# Patient Record
Sex: MALE | Race: BLACK OR AFRICAN AMERICAN | NOT HISPANIC OR LATINO
[De-identification: names, ages, dates, MRNs, and addresses within clinical notes are randomized per-mention and may not be internally consistent; named-entity substitution may affect disease eponyms.]

---

## 2021-04-30 ENCOUNTER — TRANSCRIPTION ENCOUNTER (OUTPATIENT)
Age: 31
End: 2021-04-30

## 2021-05-03 ENCOUNTER — TRANSCRIPTION ENCOUNTER (OUTPATIENT)
Age: 31
End: 2021-05-03

## 2021-05-13 ENCOUNTER — TRANSCRIPTION ENCOUNTER (OUTPATIENT)
Age: 31
End: 2021-05-13

## 2021-09-01 ENCOUNTER — TRANSCRIPTION ENCOUNTER (OUTPATIENT)
Age: 31
End: 2021-09-01

## 2021-10-18 ENCOUNTER — TRANSCRIPTION ENCOUNTER (OUTPATIENT)
Age: 31
End: 2021-10-18

## 2022-11-21 ENCOUNTER — NON-APPOINTMENT (OUTPATIENT)
Age: 32
End: 2022-11-21

## 2022-12-15 ENCOUNTER — EMERGENCY (EMERGENCY)
Facility: HOSPITAL | Age: 32
LOS: 1 days | Discharge: HOME | End: 2022-12-15
Attending: EMERGENCY MEDICINE
Payer: COMMERCIAL

## 2022-12-15 VITALS
OXYGEN SATURATION: 97 % | HEIGHT: 71 IN | TEMPERATURE: 98 F | SYSTOLIC BLOOD PRESSURE: 160 MMHG | DIASTOLIC BLOOD PRESSURE: 110 MMHG | HEART RATE: 97 BPM | WEIGHT: 315 LBS | RESPIRATION RATE: 18 BRPM

## 2022-12-15 PROCEDURE — 99053 MED SERV 10PM-8AM 24 HR FAC: CPT

## 2022-12-15 PROCEDURE — 99284 EMERGENCY DEPT VISIT MOD MDM: CPT

## 2022-12-15 RX ORDER — MORPHINE SULFATE 50 MG/1
4 CAPSULE, EXTENDED RELEASE ORAL ONCE
Refills: 0 | Status: DISCONTINUED | OUTPATIENT
Start: 2022-12-15 | End: 2022-12-15

## 2022-12-15 NOTE — ED PROVIDER NOTE - PHYSICAL EXAMINATION
abrasion left posterior scalp  no midline ttp neck or back  RUE nontender, full ROM all joints, N/V intact  right hip: ttp over lateral aspect. decreased ROM 2/2 pain  right knee: ttp over lateral aspect and over patella. unable to range 2/2 pain.  remainder of the RLE nontender, no swelling, full ROM  abrasions to right lower abdomen. remainder of abdomen nontender.  left hand with swelling and ttp over dorsal hand with abrasions and decreased ROM 2/2 pain. abrasion left posterior scalp  no midline ttp neck or back  RUE nontender, full ROM all joints, N/V intact  right hip: ttp over lateral aspect. decreased ROM 2/2 pain  right knee: ttp over lateral aspect and over patella. unable to range 2/2 pain.  remainder of the RLE nontender, no swelling, full ROM  abrasions to right lower abdomen. remainder of abdomen nontender.  left hand with swelling and ttp over dorsal hand with abrasions and decreased ROM 2/2 pain. no snuffbox ttp.
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
## 2022-12-15 NOTE — ED PROVIDER NOTE - NSFOLLOWUPINSTRUCTIONS_ED_ALL_ED_FT
[facility]                                                                                                                                                            Acute Knee Pain, Adult      Acute knee pain is sudden and may be caused by damage, swelling, or irritation of the muscles and tissues that support the knee. Pain may result from:  •A fall.      •An injury to the knee from twisting motions.      •A hit to the knee.      •Infection.      Acute knee pain may go away on its own with time and rest. If it does not, your health care provider may order tests to find the cause of the pain. These may include:  •Imaging tests, such as an X-ray, MRI, CT scan, or ultrasound.      •Joint aspiration. In this test, fluid is removed from the knee and evaluated.      •Arthroscopy. In this test, a lighted tube is inserted into the knee and an image is projected onto a TV screen.      •Biopsy. In this test, a sample of tissue is removed from the body and studied under a microscope.        Follow these instructions at home:      If you have a knee sleeve or brace:      •Wear the knee sleeve or brace as told by your health care provider. Remove it only as told by your health care provider.      •Loosen it if your toes tingle, become numb, or turn cold and blue.      •Keep it clean.    •If the knee sleeve or brace is not waterproof:  •Do not let it get wet.      •Cover it with a watertight covering when you take a bath or shower.        Activity     •Rest your knee.      • Do not do things that cause pain or make pain worse.      •Avoid high-impact activities or exercises, such as running, jumping rope, or doing jumping jacks.      •Work with a physical therapist to make a safe exercise program, as recommended by your health care provider. Do exercises as told by your physical therapist.        Managing pain, stiffness, and swelling    •If directed, put ice on the affected knee. To do this:  •If you have a removable knee sleeve or brace, remove it as told by your health care provider.      •Put ice in a plastic bag.      •Place a towel between your skin and the bag.      •Leave the ice on for 20 minutes, 2–3 times a day.      •Remove the ice if your skin turns bright red. This is very important. If you cannot feel pain, heat, or cold, you have a greater risk of damage to the area.        •If directed, use an elastic bandage to put pressure (compression) on your injured knee. This may control swelling, give support, and help with discomfort.      •Raise (elevate) your knee above the level of your heart while you are sitting or lying down.      •Sleep with a pillow under your knee.      General instructions     •Take over-the-counter and prescription medicines only as told by your health care provider.      • Do not use any products that contain nicotine or tobacco, such as cigarettes, e-cigarettes, and chewing tobacco. If you need help quitting, ask your health care provider.      •If you are overweight, work with your health care provider and a dietitian to set a weight-loss goal that is healthy and reasonable for you. Extra weight can put pressure on your knee.      •Pay attention to any changes in your symptoms.      •Keep all follow-up visits. This is important.        Contact a health care provider if:    •Your knee pain continues, changes, or gets worse.      •You have a fever along with knee pain.      •Your knee feels warm to the touch or is red.      •Your knee german or locks up.        Get help right away if:    •Your knee swells, and the swelling becomes worse.      •You cannot move your knee.      •You have severe pain in your knee that cannot be managed with pain medicine.        Summary    •Acute knee pain can be caused by a fall, an injury, an infection, or damage, swelling, or irritation of the tissues that support your knee.      •Your health care provider may perform tests to find out the cause of the pain.      •Pay attention to any changes in your symptoms. Relieve your pain with rest, medicines, light activity, and the use of ice.      •Get help right away if your knee swells, you cannot move your knee, or you have severe pain that cannot be managed with medicine.      This information is not intended to replace advice given to you by your health care provider. Make sure you discuss any questions you have with your health care provider.      Document Revised: 06/02/2021 Document Reviewed: 06/02/2021    [employer] Patient Education © 2022 [employer]

## 2022-12-15 NOTE — ED PROVIDER NOTE - PROGRESS NOTE DETAILS
ct head and cervical spine WNL. ct abdo/pelvis WNL. no obvious fractures on xray. unable to ambulate 2/2 right knee pain. ct ordered. ct right knee with ?medial fracture. placed in splint and given crutches. rx for pain meds. f/u with ortho. return precautions discussed.

## 2022-12-15 NOTE — ED PROVIDER NOTE - PATIENT PORTAL LINK FT
You can access the FollowMyHealth Patient Portal offered by [facility] by registering at the following website: [email or web address]. By joining [employer]’s FollowMyHealth portal, you will also be able to view your health information using other applications (apps) compatible with our system.
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
## 2022-12-15 NOTE — ED PROVIDER NOTE - NSFOLLOWUPCLINICS_GEN_ALL_ED_FT
[facility]  Orthopedics  [address] [place] [address]  Phone: [phone]  Fax: [phone]

## 2022-12-16 LAB
ALBUMIN SERPL ELPH-MCNC: 3.7 G/DL — SIGNIFICANT CHANGE UP (ref 3.5–5)
ALP SERPL-CCNC: 62 U/L — SIGNIFICANT CHANGE UP (ref 40–120)
ALT FLD-CCNC: 50 U/L DA — SIGNIFICANT CHANGE UP (ref 10–60)
ANION GAP SERPL CALC-SCNC: 8 MMOL/L — SIGNIFICANT CHANGE UP (ref 5–17)
AST SERPL-CCNC: 36 U/L — SIGNIFICANT CHANGE UP (ref 10–40)
BASOPHILS # BLD AUTO: 0.03 K/UL — SIGNIFICANT CHANGE UP (ref 0–0.2)
BASOPHILS NFR BLD AUTO: 0.3 % — SIGNIFICANT CHANGE UP (ref 0–2)
BILIRUB SERPL-MCNC: 0.8 MG/DL — SIGNIFICANT CHANGE UP (ref 0.2–1.2)
BUN SERPL-MCNC: 15 MG/DL — SIGNIFICANT CHANGE UP (ref 7–18)
CALCIUM SERPL-MCNC: 9.3 MG/DL — SIGNIFICANT CHANGE UP (ref 8.4–10.5)
CHLORIDE SERPL-SCNC: 106 MMOL/L — SIGNIFICANT CHANGE UP (ref 96–108)
CO2 SERPL-SCNC: 27 MMOL/L — SIGNIFICANT CHANGE UP (ref 22–31)
CREAT SERPL-MCNC: 1.16 MG/DL — SIGNIFICANT CHANGE UP (ref 0.5–1.3)
EGFR: 86 ML/MIN/1.73M2 — SIGNIFICANT CHANGE UP
EOSINOPHIL # BLD AUTO: 0.03 K/UL — SIGNIFICANT CHANGE UP (ref 0–0.5)
EOSINOPHIL NFR BLD AUTO: 0.3 % — SIGNIFICANT CHANGE UP (ref 0–6)
GLUCOSE SERPL-MCNC: 121 MG/DL — HIGH (ref 70–99)
HCT VFR BLD CALC: 43.5 % — SIGNIFICANT CHANGE UP (ref 39–50)
HGB BLD-MCNC: 14.1 G/DL — SIGNIFICANT CHANGE UP (ref 13–17)
IMM GRANULOCYTES NFR BLD AUTO: 0.3 % — SIGNIFICANT CHANGE UP (ref 0–0.9)
LYMPHOCYTES # BLD AUTO: 1.73 K/UL — SIGNIFICANT CHANGE UP (ref 1–3.3)
LYMPHOCYTES # BLD AUTO: 17.1 % — SIGNIFICANT CHANGE UP (ref 13–44)
MCHC RBC-ENTMCNC: 28.1 PG — SIGNIFICANT CHANGE UP (ref 27–34)
MCHC RBC-ENTMCNC: 32.4 GM/DL — SIGNIFICANT CHANGE UP (ref 32–36)
MCV RBC AUTO: 86.7 FL — SIGNIFICANT CHANGE UP (ref 80–100)
MONOCYTES # BLD AUTO: 0.8 K/UL — SIGNIFICANT CHANGE UP (ref 0–0.9)
MONOCYTES NFR BLD AUTO: 7.9 % — SIGNIFICANT CHANGE UP (ref 2–14)
NEUTROPHILS # BLD AUTO: 7.52 K/UL — HIGH (ref 1.8–7.4)
NEUTROPHILS NFR BLD AUTO: 74.1 % — SIGNIFICANT CHANGE UP (ref 43–77)
NRBC # BLD: 0 /100 WBCS — SIGNIFICANT CHANGE UP (ref 0–0)
PLATELET # BLD AUTO: 209 K/UL — SIGNIFICANT CHANGE UP (ref 150–400)
POTASSIUM SERPL-MCNC: 4.1 MMOL/L — SIGNIFICANT CHANGE UP (ref 3.5–5.3)
POTASSIUM SERPL-SCNC: 4.1 MMOL/L — SIGNIFICANT CHANGE UP (ref 3.5–5.3)
PROT SERPL-MCNC: 7.7 G/DL — SIGNIFICANT CHANGE UP (ref 6–8.3)
RBC # BLD: 5.02 M/UL — SIGNIFICANT CHANGE UP (ref 4.2–5.8)
RBC # FLD: 12.6 % — SIGNIFICANT CHANGE UP (ref 10.3–14.5)
SODIUM SERPL-SCNC: 141 MMOL/L — SIGNIFICANT CHANGE UP (ref 135–145)
WBC # BLD: 10.14 K/UL — SIGNIFICANT CHANGE UP (ref 3.8–10.5)
WBC # FLD AUTO: 10.14 K/UL — SIGNIFICANT CHANGE UP (ref 3.8–10.5)

## 2022-12-16 PROCEDURE — 72125 CT NECK SPINE W/O DYE: CPT | Mod: MA

## 2022-12-16 PROCEDURE — 73564 X-RAY EXAM KNEE 4 OR MORE: CPT | Mod: 26,RT

## 2022-12-16 PROCEDURE — 73130 X-RAY EXAM OF HAND: CPT

## 2022-12-16 PROCEDURE — 73564 X-RAY EXAM KNEE 4 OR MORE: CPT

## 2022-12-16 PROCEDURE — 73610 X-RAY EXAM OF ANKLE: CPT | Mod: 26,RT

## 2022-12-16 PROCEDURE — 73700 CT LOWER EXTREMITY W/O DYE: CPT | Mod: MA

## 2022-12-16 PROCEDURE — 70450 CT HEAD/BRAIN W/O DYE: CPT | Mod: MA

## 2022-12-16 PROCEDURE — 72125 CT NECK SPINE W/O DYE: CPT | Mod: 26,MA

## 2022-12-16 PROCEDURE — 36415 COLL VENOUS BLD VENIPUNCTURE: CPT

## 2022-12-16 PROCEDURE — 73610 X-RAY EXAM OF ANKLE: CPT

## 2022-12-16 PROCEDURE — 74177 CT ABD & PELVIS W/CONTRAST: CPT | Mod: MA

## 2022-12-16 PROCEDURE — 73502 X-RAY EXAM HIP UNI 2-3 VIEWS: CPT | Mod: 26,RT

## 2022-12-16 PROCEDURE — 73130 X-RAY EXAM OF HAND: CPT | Mod: 26,LT

## 2022-12-16 PROCEDURE — 74177 CT ABD & PELVIS W/CONTRAST: CPT | Mod: 26,MA

## 2022-12-16 PROCEDURE — 99284 EMERGENCY DEPT VISIT MOD MDM: CPT | Mod: 25

## 2022-12-16 PROCEDURE — 80053 COMPREHEN METABOLIC PANEL: CPT

## 2022-12-16 PROCEDURE — 73590 X-RAY EXAM OF LOWER LEG: CPT

## 2022-12-16 PROCEDURE — 70450 CT HEAD/BRAIN W/O DYE: CPT | Mod: 26,MA

## 2022-12-16 PROCEDURE — 73590 X-RAY EXAM OF LOWER LEG: CPT | Mod: 26,RT

## 2022-12-16 PROCEDURE — 85025 COMPLETE CBC W/AUTO DIFF WBC: CPT

## 2022-12-16 PROCEDURE — 73700 CT LOWER EXTREMITY W/O DYE: CPT | Mod: 26,RT,MA

## 2022-12-16 PROCEDURE — 73502 X-RAY EXAM HIP UNI 2-3 VIEWS: CPT

## 2022-12-16 RX ORDER — CYCLOBENZAPRINE HYDROCHLORIDE 10 MG/1
10 TABLET, FILM COATED ORAL ONCE
Refills: 0 | Status: COMPLETED | OUTPATIENT
Start: 2022-12-16 | End: 2022-12-16

## 2022-12-16 RX ORDER — IBUPROFEN 200 MG
1 TABLET ORAL
Qty: 30 | Refills: 0
Start: 2022-12-16 | End: 2022-12-25

## 2022-12-16 RX ORDER — OXYCODONE AND ACETAMINOPHEN 5; 325 MG/1; MG/1
1 TABLET ORAL
Qty: 20 | Refills: 0
Start: 2022-12-16 | End: 2022-12-20

## 2022-12-16 RX ADMIN — MORPHINE SULFATE 4 MILLIGRAM(S): 50 CAPSULE, EXTENDED RELEASE ORAL at 00:07

## 2022-12-16 RX ADMIN — CYCLOBENZAPRINE HYDROCHLORIDE 10 MILLIGRAM(S): 10 TABLET, FILM COATED ORAL at 03:32

## 2022-12-16 RX ADMIN — MORPHINE SULFATE 4 MILLIGRAM(S): 50 CAPSULE, EXTENDED RELEASE ORAL at 00:37
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
## 2022-12-22 ENCOUNTER — FORM ENCOUNTER (OUTPATIENT)
Age: 32
End: 2022-12-22

## 2022-12-23 ENCOUNTER — APPOINTMENT (OUTPATIENT)
Dept: ORTHOPEDIC SURGERY | Facility: CLINIC | Age: 32
End: 2022-12-23
Payer: COMMERCIAL

## 2022-12-23 ENCOUNTER — APPOINTMENT (OUTPATIENT)
Dept: MRI IMAGING | Facility: CLINIC | Age: 32
End: 2022-12-23

## 2022-12-23 VITALS — WEIGHT: 315 LBS | HEIGHT: 71 IN | BODY MASS INDEX: 44.1 KG/M2

## 2022-12-23 DIAGNOSIS — Z86.79 PERSONAL HISTORY OF OTHER DISEASES OF THE CIRCULATORY SYSTEM: ICD-10-CM

## 2022-12-23 DIAGNOSIS — M23.91 UNSPECIFIED INTERNAL DERANGEMENT OF RIGHT KNEE: ICD-10-CM

## 2022-12-23 PROCEDURE — L3809: CPT

## 2022-12-23 PROCEDURE — 99204 OFFICE O/P NEW MOD 45 MIN: CPT

## 2022-12-23 PROCEDURE — 99072 ADDL SUPL MATRL&STAF TM PHE: CPT

## 2022-12-23 PROCEDURE — 73721 MRI JNT OF LWR EXTRE W/O DYE: CPT | Mod: RT

## 2022-12-23 RX ORDER — LOSARTAN POTASSIUM 100 MG/1
TABLET, FILM COATED ORAL
Refills: 0 | Status: ACTIVE | COMMUNITY

## 2022-12-23 NOTE — DATA REVIEWED
[CT Scan] : CT scan [Right] : of the right [Knee] : knee [I independently reviewed and interpreted images and report] : I independently reviewed and interpreted images and report [FreeTextEntry1] : ocd mfc. medial compartment djd. loose body. effusoin

## 2022-12-23 NOTE — IMAGING
[de-identified] : \par ----------------------------------------------------------------------------\par \par Right knee exam: \par \par obesity\par Skin: no significant pertinent finding\par Inspection: \par    +Effusion\par    (-) Malalignment\par    (-) Swelling\par    (-) Quad atrophy\par    (-) J-sign\par ROM: \par    0 - 70 degrees of flexion.\par Tenderness: \par    +MJLT\par    (-) LJLT\par    +Medial patellar facet tenderness\par    +Lateral patellar facet tenderness\par    (-) Crepitus\par    (-) Patellar grind tenderness\par    (-) Patellar tendon\par    (-) Quad tendon\par    Other: \par Stability: \par    equivocal Lachman\par    +Varus/Valgus instability\par Strength: 5/5 Q/H/TA/GS/EHL\par Neuro: In tact to light touch throughout, DTR's wnl\par Vascularity: Extremity warm and well perfused\par Gait: antalgic with crtuches\par  \par \par ----------------------------------------------------------------------------\par \par Left wrist/hand exam: \par \par Inspection: \par    (-) Deformity\par    (-) Swelling/Effusion\par ROM: \par    WF: 80  WE: 80   Digit ROM: full\par Tenderness: \par    (-) Dorsal wrist      (-) Volar wrist\par    (-) Radial wrist       (-) Ulnar wrist\par    (-) Snuff box\par    (-) DRUJ\par    (-) Thumb CMC\par    (-) A1 pulley                                                               (-) 1st   (-) 2nd   (-) 3rd   (-) 4th   (-) 5th\par    (-) Metacarpal        (-) Phalanges                               (-) 1st   (-) 2nd   (-) 3rd   (-) 4th   (-) 5th\par    +MP joint              (-) PIP joint           (-) DIP joint       +1st   (-) 2nd   (-) 3rd   (-) 4th   (-) 5th   no instability \par Additional tests: \par    (-) Carpal compression\par    (-) Tinel's\par    (-) Finkelstein's\par    (-) Tony's\par    (-) CMC grind\par    (-) TFCC grind\par    (-) Tight lateral retinaculum\par Strength: 5/5 DF/VF//Intrinsic\par Neuro: In tact to light touch throughout all distributions\par Vascularity: Extremity warm and well perfused\par \par  [Left] : left hand [Right] : right knee [All Views] : anteroposterior, lateral, skyline, and anteroposterior standing [There are no fractures, subluxations or dislocations. No significant abnormalities are seen] : There are no fractures, subluxations or dislocations. No significant abnormalities are seen [FreeTextEntry9] : medial oa. ? loose body

## 2022-12-23 NOTE — DISCUSSION/SUMMARY
[de-identified] : ice, rest, continue brace. crutches. nsaids prn\par mri right knee ro internal derangeent acl/mcl. \par thumb spica brace distributed and fitted\par \par ----------------------------------------------------------------------------\par \par The patient was advised of the diagnosis.  The natural history of the pathology was explained in full. All questions were answered.  The risks and benefits of conservative and interventional treatment alternatives were explained to the patient\par \par \par ----------------------------------------------------------------------------\par \par The patient was advised that an advanced diagnostic/imaging study (MRI/CT/etc) will be ordered to evaluate potential pathology in the affected area(s).  The patient was further advised to follow up in the office to review the results of the study and determine further management that may be indicated.\par \par \par ----------------------------------------------------------------------------\par \par MRI(s) and/or other advanced imaging studies (CT/etc) were reviewed with the patient. Implications of the study(ies) together with the patient's clinical picture were discussed to formulate a working diagnosis and management options were detailed.\par
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
## 2022-12-28 ENCOUNTER — TRANSCRIPTION ENCOUNTER (OUTPATIENT)
Age: 32
End: 2022-12-28

## 2022-12-28 ENCOUNTER — APPOINTMENT (OUTPATIENT)
Dept: ORTHOPEDIC SURGERY | Facility: CLINIC | Age: 32
End: 2022-12-28

## 2022-12-28 VITALS — HEIGHT: 71 IN | WEIGHT: 315 LBS | BODY MASS INDEX: 44.1 KG/M2

## 2022-12-28 PROCEDURE — 99215 OFFICE O/P EST HI 40 MIN: CPT

## 2022-12-28 PROCEDURE — L1833: CPT | Mod: RT

## 2022-12-28 PROCEDURE — 99072 ADDL SUPL MATRL&STAF TM PHE: CPT

## 2022-12-29 NOTE — DATA REVIEWED
[CT Scan] : CT scan [MRI] : MRI [Right] : of the right [Knee] : knee [I independently reviewed and interpreted images and report] : I independently reviewed and interpreted images and report [FreeTextEntry1] : ocd mfc. medial compartment djd. loose body. effusoin [FreeTextEntry2] : complete acl tear, complete mcl tear, ?lcl tear. pcl attenuation, mfc ocd, signfiicant medial compartment djd, varus malalignment, effusion.

## 2022-12-29 NOTE — DISCUSSION/SUMMARY
[de-identified] : reviewed mri. discssed compliex pathology, including signiificant varus malalignment, mcl tear which appears unlikeely to heal, ?lcll and pcl injury which would require exam under anesthesia to better assess given patient body habitus. advised signfiicant djd which would likely result in short and long term dysfunction even with surgical managemnt of ligament injuries. disucssed likely need for TKA at some point in the future, but currently patients signifcant obesity would not qualify him.  advised he therefore needs stabilization of hiss knee, however with extent of malalignment, i am not sure isloated ligament reconstruction will not fiail. advised patient would likely require osteotomy, mcl repair/reconstruction and possible further staged procedures thereafter. we will continue to consider operative approach. first patient will obtain CT angiogram right knee as patient may have had a knee dislocation that spontaneously reduced.  begin PT. he will utilize a hinged knee brace for stability.  wbat with brace. \par \par ----------------------------------------------------------------------------\par \par Bracing options discussed for patient's relative knee instability. Hinged knee brace demonstrated, distributed, and fitted.\par \par ----------------------------------------------------------------------------\par \par The patient was advised of the diagnosis.  The natural history of the pathology was explained in full. All questions were answered.  The risks and benefits of conservative and interventional treatment alternatives were explained to the patient\par \par \par ----------------------------------------------------------------------------\par \par MRI(s) and/or other advanced imaging studies (CT/etc) were reviewed with the patient. Implications of the study(ies) together with the patient's clinical picture were discussed to formulate a working diagnosis and management options were detailed.\par

## 2022-12-29 NOTE — IMAGING
[Left] : left hand [Right] : right knee [All Views] : anteroposterior, lateral, skyline, and anteroposterior standing [There are no fractures, subluxations or dislocations. No significant abnormalities are seen] : There are no fractures, subluxations or dislocations. No significant abnormalities are seen [de-identified] : \par ----------------------------------------------------------------------------\par \par Right knee exam: \par \par obesity\par Skin: no significant pertinent finding\par Inspection: \par    +Effusion\par    ++ Varus Malalignment\par    (-) Swelling\par    (-) Quad atrophy\par    (-) J-sign\par ROM: \par    0 - 70 degrees of flexion.\par Tenderness: \par    +MJLT\par    (-) LJLT\par    +Medial patellar facet tenderness\par    +Lateral patellar facet tenderness\par    (-) Crepitus\par    (-) Patellar grind tenderness\par    (-) Patellar tendon\par    (-) Quad tendon\par    Other: \par Stability: \par    equivocal Lachman\par    +Varus/Valgus instability\par Strength: 5/5 Q/H/TA/GS/EHL\par Neuro: In tact to light touch throughout, DTR's wnl\par Vascularity: Extremity warm and well perfused\par Gait: antalgic with crtuches\par  \par \par ----------------------------------------------------------------------------\par \par Left wrist/hand exam: \par \par Inspection: \par    (-) Deformity\par    (-) Swelling/Effusion\par ROM: \par    WF: 80  WE: 80   Digit ROM: full\par Tenderness: \par    (-) Dorsal wrist      (-) Volar wrist\par    (-) Radial wrist       (-) Ulnar wrist\par    (-) Snuff box\par    (-) DRUJ\par    (-) Thumb CMC\par    (-) A1 pulley                                                               (-) 1st   (-) 2nd   (-) 3rd   (-) 4th   (-) 5th\par    (-) Metacarpal        (-) Phalanges                               (-) 1st   (-) 2nd   (-) 3rd   (-) 4th   (-) 5th\par    +MP joint              (-) PIP joint           (-) DIP joint       +1st   (-) 2nd   (-) 3rd   (-) 4th   (-) 5th   no instability \par Additional tests: \par    (-) Carpal compression\par    (-) Tinel's\par    (-) Finkelstein's\par    (-) Tony's\par    (-) CMC grind\par    (-) TFCC grind\par    (-) Tight lateral retinaculum\par Strength: 5/5 DF/VF//Intrinsic\par Neuro: In tact to light touch throughout all distributions\par Vascularity: Extremity warm and well perfused\par \par  [FreeTextEntry9] : medial oa. ? loose body
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
## 2023-03-01 ENCOUNTER — APPOINTMENT (OUTPATIENT)
Dept: ORTHOPEDIC SURGERY | Facility: CLINIC | Age: 33
End: 2023-03-01
Payer: COMMERCIAL

## 2023-03-01 DIAGNOSIS — S83.411A SPRAIN OF MEDIAL COLLATERAL LIGAMENT OF RIGHT KNEE, INITIAL ENCOUNTER: ICD-10-CM

## 2023-03-01 PROCEDURE — 99072 ADDL SUPL MATRL&STAF TM PHE: CPT

## 2023-03-01 PROCEDURE — 99215 OFFICE O/P EST HI 40 MIN: CPT

## 2023-03-07 NOTE — IMAGING
[Left] : left hand [Right] : right knee [All Views] : anteroposterior, lateral, skyline, and anteroposterior standing [There are no fractures, subluxations or dislocations. No significant abnormalities are seen] : There are no fractures, subluxations or dislocations. No significant abnormalities are seen [de-identified] : \par ----------------------------------------------------------------------------\par \par Right knee exam: \par \par obesity\par Skin: no significant pertinent finding\par Inspection: \par    +Effusion\par    ++ Varus Malalignment\par    (-) Swelling\par    (-) Quad atrophy\par    (-) J-sign\par ROM: \par    0 - 70 degrees of flexion.\par Tenderness: \par    +MJLT\par    (-) LJLT\par    +Medial patellar facet tenderness\par    +Lateral patellar facet tenderness\par    (-) Crepitus\par    (-) Patellar grind tenderness\par    (-) Patellar tendon\par    (-) Quad tendon\par    Other: \par Stability: \par    equivocal Lachman\par    +Varus/Valgus instability\par Strength: 5/5 Q/H/TA/GS/EHL\par Neuro: In tact to light touch throughout, DTR's wnl\par Vascularity: Extremity warm and well perfused\par Gait: antalgic with crtuches\par  \par \par ----------------------------------------------------------------------------\par \par Left wrist/hand exam: \par \par Inspection: \par    (-) Deformity\par    (-) Swelling/Effusion\par ROM: \par    WF: 80  WE: 80   Digit ROM: full\par Tenderness: \par    (-) Dorsal wrist      (-) Volar wrist\par    (-) Radial wrist       (-) Ulnar wrist\par    (-) Snuff box\par    (-) DRUJ\par    (-) Thumb CMC\par    (-) A1 pulley                                                               (-) 1st   (-) 2nd   (-) 3rd   (-) 4th   (-) 5th\par    (-) Metacarpal        (-) Phalanges                               (-) 1st   (-) 2nd   (-) 3rd   (-) 4th   (-) 5th\par    +MP joint              (-) PIP joint           (-) DIP joint       +1st   (-) 2nd   (-) 3rd   (-) 4th   (-) 5th   no instability \par Additional tests: \par    (-) Carpal compression\par    (-) Tinel's\par    (-) Finkelstein's\par    (-) Tony's\par    (-) CMC grind\par    (-) TFCC grind\par    (-) Tight lateral retinaculum\par Strength: 5/5 DF/VF//Intrinsic\par Neuro: In tact to light touch throughout all distributions\par Vascularity: Extremity warm and well perfused\par \par  [FreeTextEntry9] : medial oa. ? loose body
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
## 2023-03-07 NOTE — REASON FOR VISIT
[Spouse] : spouse [FreeTextEntry2] : NF - New Injury; RT knee \par DOI 12/16/2022\par Previously seen by Dr. Kirkpatrick

## 2023-03-07 NOTE — DISCUSSION/SUMMARY
[de-identified] : reviewed mri. discussed complex pathology, including significant varus malalignment, mcl tear which appears unlikely to heal, ?lcl and pcl injury which would require exam under anesthesia to better assess given patient body habitus. advised significant djd which would likely result in short and long term dysfunction even with surgical management of ligament injuries. discussed likely need for TKA at some point in the future, but currently patients significant obesity would not qualify him.  advised he therefore needs stabilization of his knee, however with extent of malalignment, i am not sure isolated ligament reconstruction will not fail. advised patient would likely require osteotomy, mcl repair/reconstruction and possible further staged procedures thereafter.\par \par Had a lengthy discussion with patient and his spouse and patient says he has progressed well since the injury. He would like to trial nonoperative management at this time. we will continue to consider operative approach if he does not have lasting improvement. begin PT. he will utilize a hinged knee brace for stability.  wbat with brace. \par \par ----------------------------------------------------------------------------\par \par Bracing options discussed for patient's relative knee instability. Hinged knee brace demonstrated, distributed, and fitted.\par \par ----------------------------------------------------------------------------\par \par The patient was advised of the diagnosis.  The natural history of the pathology was explained in full. All questions were answered.  The risks and benefits of conservative and interventional treatment alternatives were explained to the patient\par \par \par ----------------------------------------------------------------------------\par \par MRI(s) and/or other advanced imaging studies (CT/etc) were reviewed with the patient. Implications of the study(ies) together with the patient's clinical picture were discussed to formulate a working diagnosis and management options were detailed.\par

## 2023-03-21 ENCOUNTER — FORM ENCOUNTER (OUTPATIENT)
Age: 33
End: 2023-03-21

## 2023-03-21 ENCOUNTER — APPOINTMENT (OUTPATIENT)
Dept: ORTHOPEDIC SURGERY | Facility: CLINIC | Age: 33
End: 2023-03-21
Payer: COMMERCIAL

## 2023-03-21 PROCEDURE — 99214 OFFICE O/P EST MOD 30 MIN: CPT

## 2023-03-21 PROCEDURE — 73140 X-RAY EXAM OF FINGER(S): CPT | Mod: LT
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
## 2023-03-21 NOTE — ASSESSMENT
[FreeTextEntry1] : The condition was explained to the patient. X-rays show bony prominence off radial aspect of thumb metacarpal head, likely due to RCL injury. RCL stable on exam, but exam concerning for UCL tear.\par Discussed risks and benefits of surgical vs non-surgical treatment.\par Discussed risk of persistent pain, stiffness, instability, post-traumatic arthritis.\par - MRI L thumb to evaluate for UCL tear.\par \par F/u after MRI.

## 2023-03-22 ENCOUNTER — APPOINTMENT (OUTPATIENT)
Dept: MRI IMAGING | Facility: CLINIC | Age: 33
End: 2023-03-22
Payer: COMMERCIAL

## 2023-03-22 PROCEDURE — 73218 MRI UPPER EXTREMITY W/O DYE: CPT | Mod: LT

## 2023-03-28 ENCOUNTER — APPOINTMENT (OUTPATIENT)
Dept: ORTHOPEDIC SURGERY | Facility: CLINIC | Age: 33
End: 2023-03-28
Payer: COMMERCIAL

## 2023-03-28 DIAGNOSIS — S63.642A SPRAIN OF METACARPOPHALANGEAL JOINT OF LEFT THUMB, INITIAL ENCOUNTER: ICD-10-CM

## 2023-03-28 PROCEDURE — 99213 OFFICE O/P EST LOW 20 MIN: CPT

## 2023-03-28 NOTE — ASSESSMENT
[FreeTextEntry1] : MRI L thumb reviewed with patient.\par The condition was explained to the patient.\par Discussed risks and benefits of surgical (RCL repair vs reconstruction) vs non-surgical treatment. Discussed risk of persistent pain, stiffness, instability, post-traumatic arthritis. Discussed that he may need a thumb MPJ fusion if he develops symptomatic MPJ arthritis in the future.\par - prescribed OT custom hand-based thumb spica splint PRN pain.\par - prescribed OT for L hand.\par - light activity. limit pinching and heavy .\par \par F/u 6 weeks.

## 2023-03-28 NOTE — IMAGING
[de-identified] : LEFT HAND\par skin intact. no swelling.\par TTP to thumb dorsal/volar MPJ.\par wrist ROM: good extension, flexion. good pronation, supination.\par good EPL, FPL. good finger extension, flex to full fist. good finger abduction and adduction. \par SILT to median, ulnar, radial distribution. \par palpable radial pulse, brisk cap refill all digits.\par no triggering.\par \par thumb MPJ stress:\par thumb MPJ rests in mild ulnar angulation.\par - RCL: no pain, no instability.\par - UCL: + pain, increased opening at neutral, but good endpoint.
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
## 2023-05-15 ENCOUNTER — APPOINTMENT (OUTPATIENT)
Dept: ORTHOPEDIC SURGERY | Facility: CLINIC | Age: 33
End: 2023-05-15

## 2023-05-16 ENCOUNTER — APPOINTMENT (OUTPATIENT)
Dept: ORTHOPEDIC SURGERY | Facility: CLINIC | Age: 33
End: 2023-05-16
Payer: COMMERCIAL

## 2023-05-16 DIAGNOSIS — S63.649A SPRAIN OF METACARPOPHALANGEAL JOINT OF UNSPECIFIED THUMB, INITIAL ENCOUNTER: ICD-10-CM

## 2023-05-16 PROCEDURE — 99213 OFFICE O/P EST LOW 20 MIN: CPT
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
## 2023-05-16 NOTE — IMAGING
[de-identified] : LEFT HAND\par skin intact. no swelling.\par no TTP.\par wrist ROM: good extension, flexion. good pronation, supination.\par good EPL, FPL. good finger extension, flex to full fist. good finger abduction and adduction. \par SILT to median, ulnar, radial distribution. \par palpable radial pulse, brisk cap refill all digits.\par no triggering.\par \par thumb MPJ stress:\par thumb MPJ rests in mild ulnar angulation.\par - RCL: no pain, good endpoint.\par - UCL: no pain, good endpoint.

## 2023-05-16 NOTE — ASSESSMENT
[FreeTextEntry1] : - wear OT custom thumb spica splint PRN at-risk activity.\par - again prescribed OT for L hand.\par - advance activity as tolerated.\par \par F/u 6 weeks.

## 2023-05-26 ENCOUNTER — APPOINTMENT (OUTPATIENT)
Dept: ORTHOPEDIC SURGERY | Facility: CLINIC | Age: 33
End: 2023-05-26
Payer: COMMERCIAL

## 2023-05-26 VITALS — BODY MASS INDEX: 44.1 KG/M2 | WEIGHT: 315 LBS | HEIGHT: 71 IN

## 2023-05-26 DIAGNOSIS — S83.511A SPRAIN OF ANTERIOR CRUCIATE LIGAMENT OF RIGHT KNEE, INITIAL ENCOUNTER: ICD-10-CM

## 2023-05-26 DIAGNOSIS — M17.11 UNILATERAL PRIMARY OSTEOARTHRITIS, RIGHT KNEE: ICD-10-CM

## 2023-05-26 PROCEDURE — 99214 OFFICE O/P EST MOD 30 MIN: CPT
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
## 2023-05-31 NOTE — IMAGING
[de-identified] : \par ----------------------------------------------------------------------------\par \par Right knee exam: \par \par obesity\par Skin: no significant pertinent finding\par Inspection: \par    +Effusion\par    ++ Varus Malalignment\par    (-) Swelling\par    (-) Quad atrophy\par    (-) J-sign\par ROM: \par    0 - 70 degrees of flexion.\par Tenderness: \par    +MJLT\par    (-) LJLT\par    +Medial patellar facet tenderness\par    +Lateral patellar facet tenderness\par    (-) Crepitus\par    (-) Patellar grind tenderness\par    (-) Patellar tendon\par    (-) Quad tendon\par    Other: \par Stability: \par    equivocal Lachman\par    +Varus/Valgus instability\par Strength: 5/5 Q/H/TA/GS/EHL\par Neuro: In tact to light touch throughout, DTR's wnl\par Vascularity: Extremity warm and well perfused\par Gait: antalgic with crtuches\par  \par \par ----------------------------------------------------------------------------\par \par Left wrist/hand exam: \par \par Inspection: \par    (-) Deformity\par    (-) Swelling/Effusion\par ROM: \par    WF: 80  WE: 80   Digit ROM: full\par Tenderness: \par    (-) Dorsal wrist      (-) Volar wrist\par    (-) Radial wrist       (-) Ulnar wrist\par    (-) Snuff box\par    (-) DRUJ\par    (-) Thumb CMC\par    (-) A1 pulley                                                               (-) 1st   (-) 2nd   (-) 3rd   (-) 4th   (-) 5th\par    (-) Metacarpal        (-) Phalanges                               (-) 1st   (-) 2nd   (-) 3rd   (-) 4th   (-) 5th\par    +MP joint              (-) PIP joint           (-) DIP joint       +1st   (-) 2nd   (-) 3rd   (-) 4th   (-) 5th   no instability \par Additional tests: \par    (-) Carpal compression\par    (-) Tinel's\par    (-) Finkelstein's\par    (-) Tony's\par    (-) CMC grind\par    (-) TFCC grind\par    (-) Tight lateral retinaculum\par Strength: 5/5 DF/VF//Intrinsic\par Neuro: In tact to light touch throughout all distributions\par Vascularity: Extremity warm and well perfused\par \par

## 2023-05-31 NOTE — DISCUSSION/SUMMARY
[de-identified] : Patient would like to continue PT - deferring surgery at this time.\par \par ----------------------------------------------------------------------------\par \par Bracing options discussed for patient's relative knee instability. Hinged knee brace demonstrated, distributed, and fitted.\par \par ----------------------------------------------------------------------------\par \par The patient was advised of the diagnosis.  The natural history of the pathology was explained in full. All questions were answered.  The risks and benefits of conservative and interventional treatment alternatives were explained to the patient\par \par \par ----------------------------------------------------------------------------\par \par MRI(s) and/or other advanced imaging studies (CT/etc) were reviewed with the patient. Implications of the study(ies) together with the patient's clinical picture were discussed to formulate a working diagnosis and management options were detailed.\par

## 2023-08-05 ENCOUNTER — NON-APPOINTMENT (OUTPATIENT)
Age: 33
End: 2023-08-05

## 2023-08-22 ENCOUNTER — APPOINTMENT (OUTPATIENT)
Dept: ORTHOPEDIC SURGERY | Facility: CLINIC | Age: 33
End: 2023-08-22

## 2023-08-29 ENCOUNTER — APPOINTMENT (OUTPATIENT)
Dept: ORTHOPEDIC SURGERY | Facility: CLINIC | Age: 33
End: 2023-08-29

## 2023-08-30 ENCOUNTER — APPOINTMENT (OUTPATIENT)
Dept: ORTHOPEDIC SURGERY | Facility: CLINIC | Age: 33
End: 2023-08-30

## 2024-05-06 ENCOUNTER — NON-APPOINTMENT (OUTPATIENT)
Age: 34
End: 2024-05-06

## 2024-05-14 ENCOUNTER — NON-APPOINTMENT (OUTPATIENT)
Age: 34
End: 2024-05-14

## 2025-04-26 ENCOUNTER — NON-APPOINTMENT (OUTPATIENT)
Age: 35
End: 2025-04-26